# Patient Record
(demographics unavailable — no encounter records)

---

## 2025-04-30 NOTE — HISTORY OF PRESENT ILLNESS
[FreeTextEntry1] : pt presents for c/o excessive vaginal discharge and odor for the past few weeks also hair loss

## 2025-04-30 NOTE — PHYSICAL EXAM
[Labia Majora] : normal [Labia Minora] : normal [Discharge] : a  ~M vaginal discharge was present [IUD String] : an IUD string was noted [Normal] : normal [Uterine Adnexae] : normal